# Patient Record
Sex: MALE | Race: WHITE | Employment: FULL TIME | URBAN - NONMETROPOLITAN AREA
[De-identification: names, ages, dates, MRNs, and addresses within clinical notes are randomized per-mention and may not be internally consistent; named-entity substitution may affect disease eponyms.]

---

## 2017-02-09 ENCOUNTER — TELEPHONE (OUTPATIENT)
Dept: CARDIOLOGY | Age: 52
End: 2017-02-09

## 2017-02-14 ENCOUNTER — OFFICE VISIT (OUTPATIENT)
Dept: CARDIOLOGY | Age: 52
End: 2017-02-14
Payer: COMMERCIAL

## 2017-02-14 VITALS
DIASTOLIC BLOOD PRESSURE: 84 MMHG | BODY MASS INDEX: 34.84 KG/M2 | HEIGHT: 67 IN | HEART RATE: 62 BPM | WEIGHT: 222 LBS | SYSTOLIC BLOOD PRESSURE: 170 MMHG

## 2017-02-14 DIAGNOSIS — R07.9 CHEST PAIN, UNSPECIFIED TYPE: ICD-10-CM

## 2017-02-14 DIAGNOSIS — I10 ESSENTIAL HYPERTENSION: Primary | ICD-10-CM

## 2017-02-14 PROCEDURE — 99213 OFFICE O/P EST LOW 20 MIN: CPT | Performed by: CLINICAL NURSE SPECIALIST

## 2017-02-14 PROCEDURE — 93000 ELECTROCARDIOGRAM COMPLETE: CPT | Performed by: CLINICAL NURSE SPECIALIST

## 2017-04-26 ENCOUNTER — TELEPHONE (OUTPATIENT)
Dept: CARDIOLOGY | Age: 52
End: 2017-04-26

## 2017-04-28 RX ORDER — LOSARTAN POTASSIUM 50 MG/1
50 TABLET ORAL DAILY
Qty: 30 TABLET | Refills: 3 | Status: SHIPPED | OUTPATIENT
Start: 2017-04-28 | End: 2017-07-25 | Stop reason: SDUPTHER

## 2017-07-25 RX ORDER — LOSARTAN POTASSIUM 50 MG/1
TABLET ORAL
Qty: 30 TABLET | Refills: 5 | Status: SHIPPED | OUTPATIENT
Start: 2017-07-25

## 2017-09-07 ENCOUNTER — OFFICE VISIT (OUTPATIENT)
Dept: CARDIOLOGY | Age: 52
End: 2017-09-07
Payer: COMMERCIAL

## 2017-09-07 VITALS
HEIGHT: 67 IN | DIASTOLIC BLOOD PRESSURE: 66 MMHG | SYSTOLIC BLOOD PRESSURE: 140 MMHG | WEIGHT: 225 LBS | BODY MASS INDEX: 35.31 KG/M2 | HEART RATE: 78 BPM

## 2017-09-07 DIAGNOSIS — E78.2 MIXED HYPERLIPIDEMIA: ICD-10-CM

## 2017-09-07 DIAGNOSIS — I10 ESSENTIAL HYPERTENSION: Primary | ICD-10-CM

## 2017-09-07 PROCEDURE — 99213 OFFICE O/P EST LOW 20 MIN: CPT | Performed by: INTERNAL MEDICINE

## 2017-11-22 ENCOUNTER — TELEPHONE (OUTPATIENT)
Dept: CARDIOLOGY | Age: 52
End: 2017-11-22

## 2017-11-27 NOTE — TELEPHONE ENCOUNTER
Spoke with patient and explained I could make an appointment or he could go see his PCP. Patient states he would rather go see his PCP since he is closer and he has already been talking with him about this. I voiced if he changed his mind to call our office back and we will get him an appointment.

## 2023-09-21 ENCOUNTER — TELEPHONE (OUTPATIENT)
Dept: UROLOGY | Facility: CLINIC | Age: 58
End: 2023-09-21
Payer: COMMERCIAL

## 2023-09-21 NOTE — TELEPHONE ENCOUNTER
Patient called to ask what he needed to bring to his appt with Moise Gaitan pt to bring disk from [facility] [place]

## 2023-09-28 NOTE — PROGRESS NOTES
Subjective    Mr. Ma is 58 y.o. male    Chief Complaint: Kidney Stone     History of Present Illness  Patient is a 58-year-old gentleman who was referred to us for findings on CT scan as well as symptoms follow-up possibly related to a kidney stone seen in the right kidney.  I looked at the CT scan and it shows a stone in the right kidney that is nonobstructing it is linear in shape and in its longest plane it is approximately 5 mm.  There is no obstructing stone in either ureter.  No evidence of obstruction or renal mass.  Denies any urinary tract symptoms.    The following portions of the patient's history were reviewed and updated as appropriate: allergies, current medications, past family history, past medical history, past social history, past surgical history and problem list.    Review of Systems   Constitutional:  Negative for appetite change and fever.   HENT:  Negative for hearing loss and sore throat.    Eyes:  Negative for pain and redness.   Respiratory:  Negative for cough and shortness of breath.    Cardiovascular:  Negative for chest pain and leg swelling.   Gastrointestinal:  Negative for anal bleeding, nausea and vomiting.   Endocrine: Negative for cold intolerance and heat intolerance.   Genitourinary:  Positive for flank pain. Negative for dysuria and hematuria.        Intermittent flank pain on the right    Musculoskeletal:  Negative for joint swelling and myalgias.   Skin:  Negative for color change and rash.   Allergic/Immunologic: Negative for immunocompromised state.   Neurological:  Negative for dizziness and speech difficulty.   Hematological:  Negative for adenopathy. Does not bruise/bleed easily.   Psychiatric/Behavioral:  Negative for dysphoric mood and suicidal ideas.          Current Outpatient Medications:     aspirin 81 MG EC tablet, Take 1 tablet by mouth Daily., Disp: , Rfl:     atorvastatin (LIPITOR) 40 MG tablet, Take 1 tablet by mouth Daily., Disp: , Rfl:     docusate  "sodium 100 MG capsule, Take 1 capsule by mouth Daily., Disp: , Rfl:     esomeprazole (nexIUM) 40 MG capsule, Take 1 capsule by mouth Every Morning Before Breakfast., Disp: , Rfl:     losartan (COZAAR) 100 MG tablet, Take 0.5 mg by mouth Daily., Disp: , Rfl:     sildenafil (VIAGRA) 100 MG tablet, Take 1 tablet by mouth Daily As Needed for Erectile Dysfunction., Disp: , Rfl:     Past Medical History:   Diagnosis Date    GERD (gastroesophageal reflux disease)     Hypertension        History reviewed. No pertinent surgical history.    Social History     Socioeconomic History    Marital status:    Tobacco Use    Smoking status: Never     Passive exposure: Never    Smokeless tobacco: Never       History reviewed. No pertinent family history.    Objective    Temp 98.8 øF (37.1 øC) (Temporal)   Ht 170.2 cm (67\")   Wt 91.3 kg (201 lb 3.2 oz)   BMI 31.51 kg/mý     Physical Exam  Vitals reviewed.   Constitutional:       Appearance: Normal appearance.   HENT:      Head: Normocephalic and atraumatic.   Pulmonary:      Effort: Pulmonary effort is normal.   Abdominal:      General: Abdomen is flat.      Palpations: Abdomen is soft.      Tenderness: There is no right CVA tenderness or left CVA tenderness.   Musculoskeletal:         General: Tenderness present.      Comments: Tenderness right lateral lumbosacral area.   Skin:     General: Skin is warm and dry.   Neurological:      Mental Status: He is alert.   Psychiatric:         Mood and Affect: Mood normal.         Behavior: Behavior normal.         KUB independent review    A KUB is available for me to review today.  The image is inspected for a bowel gas pattern and the general bone structure of the spine and pelvis. The kidneys are then inspected closely.  Renal outline is noted if identifiable. The kidney, collecting system, and anticipated path of the ureter are examined for calcifications including those in the true pelvis.  This film reveals:    On the right " there is a single renal stone measuring 5 mm.    On the left there are no calcificaitons seen in the kidney or the expected course of the ureter. .    No results found for this or any previous visit.  Assessment and Plan    Diagnoses and all orders for this visit:    1. Kidney stone on right side (Primary)  -     Cancel: POC Urinalysis Dipstick, Multipro  -     XR Abdomen KUB; Future  -     XR Abdomen KUB; Future    The right lateral lower back pain that radiates into the abdomen I am not able to do explain from a kidney stone or urologic standpoint.  Stone is small and not obstructing in the right kidney there is no ureteral stones no abnormalities of the bladder noted on CT scan.  The stone is seen on the KUB there is another calcification seen in the kidney on the KUB but this is our must be artifact due to the fact on CT scan there was just one stone.  Recommend follow-up in 3 months with KUB prior.  I also gave him stone dietary sheet.

## 2023-10-04 ENCOUNTER — TELEPHONE (OUTPATIENT)
Dept: UROLOGY | Facility: CLINIC | Age: 58
End: 2023-10-04
Payer: COMMERCIAL

## 2023-10-09 ENCOUNTER — OFFICE VISIT (OUTPATIENT)
Dept: UROLOGY | Facility: CLINIC | Age: 58
End: 2023-10-09
Payer: COMMERCIAL

## 2023-10-09 ENCOUNTER — HOSPITAL ENCOUNTER (OUTPATIENT)
Dept: GENERAL RADIOLOGY | Facility: HOSPITAL | Age: 58
Discharge: HOME | End: 2023-10-09
Admitting: PHYSICIAN ASSISTANT
Payer: COMMERCIAL

## 2023-10-09 VITALS — BODY MASS INDEX: 31.58 KG/M2 | WEIGHT: 201.2 LBS | HEIGHT: 67 IN | TEMPERATURE: 98.8 F

## 2023-10-09 DIAGNOSIS — N20.0 KIDNEY STONE ON RIGHT SIDE: Primary | ICD-10-CM

## 2023-10-09 DIAGNOSIS — N20.0 KIDNEY STONE: ICD-10-CM

## 2023-10-09 PROCEDURE — 99204 OFFICE O/P NEW MOD 45 MIN: CPT | Performed by: PHYSICIAN ASSISTANT

## 2023-10-09 PROCEDURE — 74018 RADEX ABDOMEN 1 VIEW: CPT

## 2023-10-09 RX ORDER — ATORVASTATIN CALCIUM 40 MG/1
40 TABLET, FILM COATED ORAL DAILY
COMMUNITY

## 2023-10-09 RX ORDER — LOSARTAN POTASSIUM 100 MG/1
0.5 TABLET ORAL DAILY
COMMUNITY

## 2023-10-09 RX ORDER — ASPIRIN 81 MG/1
81 TABLET ORAL DAILY
COMMUNITY

## 2023-10-09 RX ORDER — ESOMEPRAZOLE MAGNESIUM 40 MG/1
40 CAPSULE, DELAYED RELEASE ORAL
COMMUNITY

## 2023-10-09 RX ORDER — PSEUDOEPHEDRINE HCL 30 MG
100 TABLET ORAL DAILY
COMMUNITY

## 2023-10-09 RX ORDER — SILDENAFIL 100 MG/1
100 TABLET, FILM COATED ORAL DAILY PRN
COMMUNITY

## 2023-10-12 ENCOUNTER — TELEPHONE (OUTPATIENT)
Dept: UROLOGY | Facility: CLINIC | Age: 58
End: 2023-10-12
Payer: COMMERCIAL

## 2023-10-12 DIAGNOSIS — N52.9 ERECTILE DYSFUNCTION, UNSPECIFIED ERECTILE DYSFUNCTION TYPE: Primary | ICD-10-CM

## 2023-10-12 RX ORDER — TADALAFIL 20 MG/1
20 TABLET ORAL AS NEEDED
Qty: 5 TABLET | Refills: 0 | Status: SHIPPED | OUTPATIENT
Start: 2023-10-12

## 2023-10-12 NOTE — TELEPHONE ENCOUNTER
Called pt. To let him know RX had been called in and to not take it with Viagra. He v/u.       ----- Message from KATRINA Hobbs sent at 10/12/2023 11:43 AM CDT -----  Regarding: Cialis  I went ahead and sent in 5 doses or 5 pills of the Cialis so he can try before we send in a longer prescription.  Also let him know to make sure he does not take it with the Viagra.  ----- Message -----  From: Ammon Lenz RN  Sent: 10/12/2023   9:38 AM CDT  To: KATRINA Hobbs    Pt. Called saying we were supposed to send in RX for Cialis? Didn't see it in your note so jw if you meant to send that in. Wants sent to [facility] in [place] if so.

## 2023-11-06 DIAGNOSIS — N52.9 ERECTILE DYSFUNCTION, UNSPECIFIED ERECTILE DYSFUNCTION TYPE: ICD-10-CM

## 2023-11-06 RX ORDER — TADALAFIL 20 MG/1
20 TABLET ORAL AS NEEDED
Qty: 30 TABLET | Refills: 0 | Status: SHIPPED | OUTPATIENT
Start: 2023-11-06 | End: 2023-12-06

## 2024-03-27 ENCOUNTER — TELEPHONE (OUTPATIENT)
Dept: UROLOGY | Facility: CLINIC | Age: 59
End: 2024-03-27
Payer: COMMERCIAL

## 2024-03-27 NOTE — TELEPHONE ENCOUNTER
Patient calls with c/o ache where his kidney stone is. He wants to get it checked out. Made appt with Moise for next Tuesday. Pt aware to get KUB at 12:15

## 2024-03-28 NOTE — PROGRESS NOTES
Subjective    Mr. aM is 58 y.o. male    Chief Complaint: Kidney Stone     History of Present Illness  Patient with history of kidney stone returns for follow-up with KUB prior.  Patient is not had to go to the ER but he has had chronic discomfort pain in his right lower back in the lumbosacral region.  Pain comes and goes it is spasmodic in nature.  Does not appear to be associated with movement or activity.  He has not seen any gross hematuria denies any urinary tract infections.  At least 1 if not 2 stones in the right kidney no other stones are obviously seen in the ureters on KUB.      The following portions of the patient's history were reviewed and updated as appropriate: allergies, current medications, past family history, past medical history, past social history, past surgical history and problem list.    Review of Systems   Constitutional:  Negative for chills and fever.   Gastrointestinal:  Negative for abdominal pain, anal bleeding and blood in stool.   Genitourinary:  Positive for flank pain (Right). Negative for dysuria and hematuria.         Current Outpatient Medications:     aspirin 81 MG EC tablet, Take 1 tablet by mouth Daily., Disp: , Rfl:     atorvastatin (LIPITOR) 40 MG tablet, Take 1 tablet by mouth Daily., Disp: , Rfl:     docusate sodium 100 MG capsule, Take 1 capsule by mouth Daily., Disp: , Rfl:     esomeprazole (nexIUM) 40 MG capsule, Take 1 capsule by mouth Every Morning Before Breakfast., Disp: , Rfl:     losartan (COZAAR) 100 MG tablet, Take 0.5 mg by mouth Daily., Disp: , Rfl:     sildenafil (VIAGRA) 100 MG tablet, Take 1 tablet by mouth Daily As Needed for Erectile Dysfunction., Disp: , Rfl:     tadalafil (Cialis) 20 MG tablet, Take 1 tablet by mouth As Needed for Erectile Dysfunction for up to 30 days., Disp: 30 tablet, Rfl: 0    Past Medical History:   Diagnosis Date    GERD (gastroesophageal reflux disease)     Hypertension        History reviewed. No pertinent surgical  "history.    Social History     Socioeconomic History    Marital status:    Tobacco Use    Smoking status: Never     Passive exposure: Never    Smokeless tobacco: Never   Vaping Use    Vaping status: Never Used       History reviewed. No pertinent family history.    Objective    Temp 98 °F (36.7 °C)   Ht 172.7 cm (68\")   Wt 89.4 kg (197 lb)   BMI 29.95 kg/m²     Physical Exam  Vitals reviewed.   Constitutional:       General: He is not in acute distress.     Appearance: Normal appearance. He is not toxic-appearing.   HENT:      Head: Normocephalic and atraumatic.   Pulmonary:      Effort: Pulmonary effort is normal.   Skin:     Coloration: Skin is not pale.   Neurological:      Mental Status: He is alert.   Psychiatric:         Mood and Affect: Mood normal.         Behavior: Behavior normal.       KUB independent review    A KUB is available for me to review today.  The image is inspected for a bowel gas pattern and the general bone structure of the spine and pelvis. The kidneys are then inspected closely.  Renal outline is noted if identifiable. The kidney, collecting system, and anticipated path of the ureter are examined for calcifications including those in the true pelvis.  This film reveals:    On the right there are multiple renal stones.  2 stones measuring approximately 3 mm.    On the left there are no calcificaitons seen in the kidney or the expected course of the ureter. .      No results found for this or any previous visit.  Assessment and Plan    Diagnoses and all orders for this visit:    1. Kidney stone on right side (Primary)  -     Cancel: POC Urinalysis Dipstick, Multipro  -     XR Abdomen KUB; Future  -     CT Abdomen Pelvis Without Contrast; Future    2. Right flank pain  -     CT Abdomen Pelvis Without Contrast; Future    I concede to possible stones in the right kidney measuring approximately 3 mm.  On previous CT scan done July 2023 in the right upper pole there are 2 small adjacent " stones.  Looks like one of the stones is dropped into the lower part of the kidney.  Obvious stones in the course of either ureter.  Due to this flank pain he has been having for a few weeks I feel he needs further evaluation with a CT renal stone protocol.    He will return in approxi-1 week to discuss findings.

## 2024-04-01 ENCOUNTER — TELEPHONE (OUTPATIENT)
Dept: UROLOGY | Facility: CLINIC | Age: 59
End: 2024-04-01
Payer: COMMERCIAL

## 2024-04-02 ENCOUNTER — TELEPHONE (OUTPATIENT)
Dept: UROLOGY | Facility: CLINIC | Age: 59
End: 2024-04-02

## 2024-04-02 ENCOUNTER — HOSPITAL ENCOUNTER (OUTPATIENT)
Dept: GENERAL RADIOLOGY | Facility: HOSPITAL | Age: 59
Discharge: HOME | End: 2024-04-02
Admitting: PHYSICIAN ASSISTANT
Payer: COMMERCIAL

## 2024-04-02 ENCOUNTER — OFFICE VISIT (OUTPATIENT)
Dept: UROLOGY | Facility: CLINIC | Age: 59
End: 2024-04-02
Payer: COMMERCIAL

## 2024-04-02 VITALS — TEMPERATURE: 98 F | WEIGHT: 197 LBS | BODY MASS INDEX: 29.86 KG/M2 | HEIGHT: 68 IN

## 2024-04-02 DIAGNOSIS — N20.0 KIDNEY STONE ON RIGHT SIDE: ICD-10-CM

## 2024-04-02 DIAGNOSIS — N20.0 KIDNEY STONE ON RIGHT SIDE: Primary | ICD-10-CM

## 2024-04-02 DIAGNOSIS — R10.9 RIGHT FLANK PAIN: ICD-10-CM

## 2024-04-02 LAB
BILIRUB BLD-MCNC: NEGATIVE MG/DL
CLARITY, POC: CLEAR
COLOR UR: YELLOW
GLUCOSE UR STRIP-MCNC: NEGATIVE MG/DL
KETONES UR QL: NEGATIVE
LEUKOCYTE EST, POC: NEGATIVE
NITRITE UR-MCNC: NEGATIVE MG/ML
PH UR: 6.5 [PH] (ref 5–8)
PROT UR STRIP-MCNC: NEGATIVE MG/DL
RBC # UR STRIP: NEGATIVE /UL
SP GR UR: 1.03 (ref 1–1.03)
UROBILINOGEN UR QL: ABNORMAL

## 2024-04-02 PROCEDURE — 99214 OFFICE O/P EST MOD 30 MIN: CPT | Performed by: PHYSICIAN ASSISTANT

## 2024-04-02 PROCEDURE — 81001 URINALYSIS AUTO W/SCOPE: CPT | Performed by: PHYSICIAN ASSISTANT

## 2024-04-02 PROCEDURE — 74018 RADEX ABDOMEN 1 VIEW: CPT

## 2024-04-02 NOTE — TELEPHONE ENCOUNTER
Caller: EDWARD    Relationship: SELF    Best call back number: [phone]    What is the best time to reach you: ANY    Who are you requesting to speak with (clinical staff, provider,  specific staff member): CLINICAL    Do you know the name of the person who called: EDWARD    What was the call regarding: PT IS ASKING TO REPEAT KUB INSTEAD OF THE CT?  PLEASE CALL TO DISCUSS    Is it okay if the provider responds through MyChart: NO

## 2024-04-26 ENCOUNTER — TELEPHONE (OUTPATIENT)
Dept: UROLOGY | Facility: CLINIC | Age: 59
End: 2024-04-26
Payer: COMMERCIAL

## 2024-04-26 NOTE — TELEPHONE ENCOUNTER
Provider: MARLY JOHNSTON    Caller: EDWARD JOHNSTON    Relationship to Patient: SELF    Reason for Call: PATIENT WANTS TO KNOW IF MARLY WITH CALL WITH CT RESULTS WHICH IS SCHEDULED FOR MONDAY THE 29TH OR DOES HE HAVE TO MAKE AN APPOINTMENT. PLEASE REACH OUT TO THE PATIENT.AND LET HIM KNOW.    BEST NUMBER [phone]

## 2024-04-29 ENCOUNTER — HOSPITAL ENCOUNTER (OUTPATIENT)
Dept: CT IMAGING | Facility: HOSPITAL | Age: 59
Discharge: HOME | End: 2024-04-29
Admitting: PHYSICIAN ASSISTANT
Payer: COMMERCIAL

## 2024-04-29 DIAGNOSIS — R10.9 RIGHT FLANK PAIN: ICD-10-CM

## 2024-04-29 DIAGNOSIS — N20.0 KIDNEY STONE ON RIGHT SIDE: ICD-10-CM

## 2024-04-29 PROCEDURE — 74176 CT ABD & PELVIS W/O CONTRAST: CPT

## 2024-04-30 ENCOUNTER — TELEPHONE (OUTPATIENT)
Dept: UROLOGY | Facility: CLINIC | Age: 59
End: 2024-04-30
Payer: COMMERCIAL

## 2024-04-30 NOTE — TELEPHONE ENCOUNTER
I called patient and left voicemail of his new appointment time with Moise. We moved his appointment from 8 am on 5/6 to 140 pm. I asked patient to call back and confirm. I said we can reschedule if needed.    OK for HUB to confirm or reschedule.

## 2024-05-06 ENCOUNTER — OFFICE VISIT (OUTPATIENT)
Dept: UROLOGY | Facility: CLINIC | Age: 59
End: 2024-05-06
Payer: COMMERCIAL

## 2024-05-06 DIAGNOSIS — N20.0 KIDNEY STONE ON RIGHT SIDE: Primary | ICD-10-CM

## 2024-10-28 NOTE — PROGRESS NOTES
Subjective    Mr. Ma is 59 y.o. male    Chief Complaint: Kidney stone    History of Present Illness  Patient with history of kidney stones presents for 6-month follow-up with KUB prior.  Stone was found incidentally evaluating right flank pain.  CT scan showed a 5 mm stone nonobstructing in the right kidney no other obvious stones were seen on CT he got a KUB prior to his appointment today this does show a calcification in the vicinity of the right kidney consistent with a 5 mm stone seen on CT.  Patient does have occasional twinges of pain not sure if this is musculoskeletal there does not appear to be any ureteral stone seen on the KUB.  His urine is clear.      The following portions of the patient's history were reviewed and updated as appropriate: allergies, current medications, past family history, past medical history, past social history, past surgical history and problem list.    Review of Systems   Constitutional:  Negative for chills and fever.   Gastrointestinal:  Positive for nausea and vomiting.   Genitourinary:  Positive for flank pain. Negative for difficulty urinating and dysuria.         Current Outpatient Medications:     aspirin 81 MG EC tablet, Take 1 tablet by mouth Daily., Disp: , Rfl:     atorvastatin (LIPITOR) 40 MG tablet, Take 1 tablet by mouth Daily., Disp: , Rfl:     docusate sodium 100 MG capsule, Take 1 capsule by mouth Daily., Disp: , Rfl:     esomeprazole (nexIUM) 40 MG capsule, Take 1 capsule by mouth Every Morning Before Breakfast., Disp: , Rfl:     losartan (COZAAR) 100 MG tablet, Take 0.5 mg by mouth Daily., Disp: , Rfl:     sildenafil (VIAGRA) 100 MG tablet, Take 1 tablet by mouth Daily As Needed for Erectile Dysfunction., Disp: , Rfl:     TESTOSTERONE IM, Inject  into the appropriate muscle as directed by prescriber., Disp: , Rfl:     tadalafil (Cialis) 20 MG tablet, Take 1 tablet by mouth As Needed for Erectile Dysfunction for up to 30 days., Disp: 30 tablet, Rfl:  "0    Past Medical History:   Diagnosis Date    GERD (gastroesophageal reflux disease)     Hypertension        History reviewed. No pertinent surgical history.    Social History     Socioeconomic History    Marital status:    Tobacco Use    Smoking status: Never     Passive exposure: Never    Smokeless tobacco: Never   Vaping Use    Vaping status: Never Used   Substance and Sexual Activity    Alcohol use: Never    Drug use: Never       History reviewed. No pertinent family history.    Objective    Temp 98 °F (36.7 °C) (Infrared)   Ht 172.7 cm (68\")   Wt 86.6 kg (191 lb)   BMI 29.04 kg/m²     Physical Exam  Vitals reviewed.   Constitutional:       General: He is not in acute distress.     Appearance: Normal appearance. He is not toxic-appearing.   HENT:      Head: Normocephalic and atraumatic.   Neurological:      Mental Status: He is alert.             Results for orders placed or performed in visit on 11/05/24   POC Urinalysis Dipstick, Multipro    Collection Time: 11/05/24  3:11 PM    Specimen: Urine   Result Value Ref Range    Color Yellow Yellow, Straw, Dark Yellow, Jenna    Clarity, UA Clear Clear    Glucose, UA Negative Negative mg/dL    Bilirubin Negative Negative    Ketones, UA Negative Negative    Specific Gravity  1.030 1.005 - 1.030    Blood, UA Negative Negative    pH, Urine 6.5 5.0 - 8.0    Protein, POC 30 mg/dL (A) Negative mg/dL    Urobilinogen, UA 0.2 E.U./dL Normal, 0.2 E.U./dL    Nitrite, UA Negative Negative    Leukocytes Negative Negative   IPSS Questionnaire (AUA-7):  Incomplete emptying  Over the past month, how often have you had a sensation of not emptying your bladder completely after you finished urinating?: Less than 1 time in 5 (11/05/24 1507)  Frequency  Over the past month, how often have you had to urinate again less than two hours after you finishied urinating ?: Not at all (11/05/24 1507)  Intermittency  Over the past month, how often have you found you stopped and started " again several time when you urinated ?: Less than 1 time in 5 (11/05/24 1507)  Urgency  Over the last month, how often have you found it difficult  have you found it difficult to postpone urination ?: Not at all (11/05/24 1507)  Weak Stream  Over the past month, how often have you had a weak urinary stream ?: Less than half the time (11/05/24 1507)  Straining  Over the past month, how often have you had to push or strain to begin urination ?: Less than 1 time in 5 (11/05/24 1507)  Nocturia  Over the past month, how many times did you most typically get up to urinate from the time you went to bed until the time you got up in the morning ?: 1 time (11/05/24 1507)  Quality of life due to urinary symptoms  If you were to spend the rest of your life with your urinary condition the way it is now, how would feel about that?: Mostly satisfied (11/05/24 1507)    Scores  Total IPSS Score: 6 (11/05/24 1507)  Total Score = Symptomatic Level: Mildly symptomatic: 0-7 (11/05/24 1507)     KUB independent review    A KUB is available for me to review today.  The image is inspected for a bowel gas pattern and the general bone structure of the spine and pelvis. The kidneys are then inspected closely.  Renal outline is noted if identifiable. The kidney, collecting system, and anticipated path of the ureter are examined for calcifications including those in the true pelvis.  This film reveals:    On the right there is a single renal stone measuring 5 mm.    On the left there are no calcificaitons seen in the kidney or the expected course of the ureter.     Assessment and Plan    Diagnoses and all orders for this visit:    1. Kidney stone on right side (Primary)  -     POC Urinalysis Dipstick, Multipro  -     XR Abdomen KUB; Future      There is a stone in the right kidney it is measured at approximately 5 mm.  No other obvious stones are seen after discussion we will continue to follow these radiographically repeat KUB in 6  months.

## 2024-11-04 ENCOUNTER — TELEPHONE (OUTPATIENT)
Dept: UROLOGY | Facility: CLINIC | Age: 59
End: 2024-11-04
Payer: COMMERCIAL

## 2024-11-05 ENCOUNTER — OFFICE VISIT (OUTPATIENT)
Dept: UROLOGY | Facility: CLINIC | Age: 59
End: 2024-11-05
Payer: COMMERCIAL

## 2024-11-05 ENCOUNTER — HOSPITAL ENCOUNTER (OUTPATIENT)
Dept: GENERAL RADIOLOGY | Facility: HOSPITAL | Age: 59
Discharge: HOME | End: 2024-11-05
Admitting: PHYSICIAN ASSISTANT
Payer: COMMERCIAL

## 2024-11-05 VITALS — HEIGHT: 68 IN | BODY MASS INDEX: 28.95 KG/M2 | WEIGHT: 191 LBS | TEMPERATURE: 98 F

## 2024-11-05 DIAGNOSIS — N20.0 KIDNEY STONE ON RIGHT SIDE: Primary | ICD-10-CM

## 2024-11-05 DIAGNOSIS — N20.0 KIDNEY STONE ON RIGHT SIDE: ICD-10-CM

## 2024-11-05 LAB
BILIRUB BLD-MCNC: NEGATIVE MG/DL
CLARITY, POC: CLEAR
COLOR UR: YELLOW
GLUCOSE UR STRIP-MCNC: NEGATIVE MG/DL
KETONES UR QL: NEGATIVE
LEUKOCYTE EST, POC: NEGATIVE
NITRITE UR-MCNC: NEGATIVE MG/ML
PH UR: 6.5 [PH] (ref 5–8)
PROT UR STRIP-MCNC: ABNORMAL MG/DL
RBC # UR STRIP: NEGATIVE /UL
SP GR UR: 1.03 (ref 1–1.03)
UROBILINOGEN UR QL: ABNORMAL

## 2024-11-05 PROCEDURE — 74018 RADEX ABDOMEN 1 VIEW: CPT

## 2025-05-01 NOTE — PROGRESS NOTES
Subjective    Mr. Ma is 59 y.o. male    Chief Complaint: Kidney Stones    History of Present Illness  Patient presents with history of kidney stones this is a 6-month follow-up with KUB prior.  Patient denies any stone episodes since he was last seen he has a known 5 mm stone in the right kidney.  Patient does not have any episodes of gross hematuria he drinks plenty of water.  Urine is clear today pH of 6.5    Also he wanted to discuss erectile dysfunction he read the information pamphlet from Dr. Lee and wants to discuss other treatment options he had tried [facility] has tried Viagra 100 mg which has not been effective in treating his erectile dysfunction he has problems both attaining and maintaining an erection.    The following portions of the patient's history were reviewed and updated as appropriate: allergies, current medications, past family history, past medical history, past social history, past surgical history and problem list.    Review of Systems      Current Outpatient Medications:     aspirin 81 MG EC tablet, Take 1 tablet by mouth Daily., Disp: , Rfl:     atorvastatin (LIPITOR) 40 MG tablet, Take 1 tablet by mouth Daily., Disp: , Rfl:     docusate sodium 100 MG capsule, Take 1 capsule by mouth Daily., Disp: , Rfl:     esomeprazole (nexIUM) 40 MG capsule, Take 1 capsule by mouth Every Morning Before Breakfast., Disp: , Rfl:     losartan (COZAAR) 100 MG tablet, Take 0.5 mg by mouth Daily. (Patient taking differently: Take 0.5 tablets by mouth Daily.), Disp: , Rfl:     sildenafil (VIAGRA) 100 MG tablet, Take 1 tablet by mouth Daily As Needed for Erectile Dysfunction., Disp: , Rfl:     tadalafil (CIALIS) 5 MG tablet, Take 1 tablet by mouth Daily., Disp: , Rfl:     TESTOSTERONE IM, Inject  into the appropriate muscle as directed by prescriber., Disp: , Rfl:     tadalafil (Cialis) 20 MG tablet, Take 1 tablet by mouth As Needed for Erectile Dysfunction for up to 30 days., Disp: 30 tablet, Rfl:  "0    Past Medical History:   Diagnosis Date    GERD (gastroesophageal reflux disease)     Hypertension        History reviewed. No pertinent surgical history.    Social History     Socioeconomic History    Marital status:    Tobacco Use    Smoking status: Never     Passive exposure: Never    Smokeless tobacco: Never   Vaping Use    Vaping status: Never Used   Substance and Sexual Activity    Alcohol use: Never    Drug use: Never       History reviewed. No pertinent family history.    Objective    Temp 97.1 °F (36.2 °C)   Ht 170.2 cm (67\")   Wt 86.8 kg (191 lb 6.4 oz)   BMI 29.98 kg/m²     Physical Exam        Results for orders placed or performed in visit on 05/09/25   POC Urinalysis Dipstick, Multipro    Collection Time: 05/09/25  2:31 PM    Specimen: Urine   Result Value Ref Range    Color Yellow Yellow, Straw, Dark Yellow, Jenna    Clarity, UA Clear Clear    Glucose, UA Negative Negative mg/dL    Bilirubin Negative Negative    Ketones, UA Negative Negative    Specific Gravity  1.025 1.005 - 1.030    Blood, UA Negative Negative    pH, Urine 6.5 5.0 - 8.0    Protein, POC Negative Negative mg/dL    Urobilinogen, UA 0.2 E.U./dL Normal, 0.2 E.U./dL    Nitrite, UA Negative Negative    Leukocytes Negative Negative     KUB independent review    A KUB is available for me to review today.  The image is inspected for a bowel gas pattern and the general bone structure of the spine and pelvis. The kidneys are then inspected closely.  Renal outline is noted if identifiable. The kidney, collecting system, and anticipated path of the ureter are examined for calcifications including those in the true pelvis.  This film reveals:    On the right there is a single renal stone measuring 5 mm.    On the left there are no calcificaitons seen in the kidney or the expected course of the ureter. .  Assessment and Plan    Diagnoses and all orders for this visit:    1. Kidney stone on right side (Primary)  -     POC Urinalysis " Dipstick, Multipro  -     XR Abdomen KUB; Future    2. Erectile dysfunction due to arterial insufficiency    3. Hyperlipidemia, unspecified hyperlipidemia type      Kidney stones he said no stone episodes since last seen KUB shows a stable 5 mm stone of the right kidney no other new stones are seen.  Recommend follow-up 1 year KUB prior.    We discussed erectile dysfunction he had failed on Viagra 100 mg after discussion he is interested in penile injections which I think would be a good second option.  He could be considered for penile prosthesis if he fails on penile injections.  After discussion we will go ahead and send in prescription request to [facility] for the standard dose Trimix.    Once he gets the medication he will contact us to make appointment to see me for penile injection trial.

## 2025-05-08 ENCOUNTER — TELEPHONE (OUTPATIENT)
Dept: UROLOGY | Facility: CLINIC | Age: 60
End: 2025-05-08
Payer: COMMERCIAL

## 2025-05-09 ENCOUNTER — OFFICE VISIT (OUTPATIENT)
Dept: UROLOGY | Facility: CLINIC | Age: 60
End: 2025-05-09
Payer: COMMERCIAL

## 2025-05-09 ENCOUNTER — HOSPITAL ENCOUNTER (OUTPATIENT)
Dept: GENERAL RADIOLOGY | Facility: HOSPITAL | Age: 60
Discharge: HOME | End: 2025-05-09
Admitting: PHYSICIAN ASSISTANT
Payer: COMMERCIAL

## 2025-05-09 VITALS — TEMPERATURE: 97.1 F | HEIGHT: 67 IN | BODY MASS INDEX: 30.04 KG/M2 | WEIGHT: 191.4 LBS

## 2025-05-09 DIAGNOSIS — N20.0 KIDNEY STONE ON RIGHT SIDE: Primary | ICD-10-CM

## 2025-05-09 DIAGNOSIS — N20.0 KIDNEY STONE ON RIGHT SIDE: ICD-10-CM

## 2025-05-09 DIAGNOSIS — E78.5 HYPERLIPIDEMIA, UNSPECIFIED HYPERLIPIDEMIA TYPE: ICD-10-CM

## 2025-05-09 DIAGNOSIS — N52.01 ERECTILE DYSFUNCTION DUE TO ARTERIAL INSUFFICIENCY: ICD-10-CM

## 2025-05-09 LAB
BILIRUB BLD-MCNC: NEGATIVE MG/DL
CLARITY, POC: CLEAR
COLOR UR: YELLOW
GLUCOSE UR STRIP-MCNC: NEGATIVE MG/DL
KETONES UR QL: NEGATIVE
LEUKOCYTE EST, POC: NEGATIVE
NITRITE UR-MCNC: NEGATIVE MG/ML
PH UR: 6.5 [PH] (ref 5–8)
PROT UR STRIP-MCNC: NEGATIVE MG/DL
RBC # UR STRIP: NEGATIVE /UL
SP GR UR: 1.02 (ref 1–1.03)
UROBILINOGEN UR QL: NORMAL

## 2025-05-09 PROCEDURE — 74018 RADEX ABDOMEN 1 VIEW: CPT

## 2025-05-09 RX ORDER — TADALAFIL 5 MG/1
1 TABLET ORAL DAILY
COMMUNITY
Start: 2025-02-17